# Patient Record
Sex: MALE | Race: ASIAN
[De-identification: names, ages, dates, MRNs, and addresses within clinical notes are randomized per-mention and may not be internally consistent; named-entity substitution may affect disease eponyms.]

---

## 2021-07-14 ENCOUNTER — APPOINTMENT (OUTPATIENT)
Dept: CARDIOLOGY | Facility: CLINIC | Age: 61
End: 2021-07-14
Payer: COMMERCIAL

## 2021-07-14 ENCOUNTER — NON-APPOINTMENT (OUTPATIENT)
Age: 61
End: 2021-07-14

## 2021-07-14 VITALS
WEIGHT: 206 LBS | DIASTOLIC BLOOD PRESSURE: 106 MMHG | HEIGHT: 67 IN | BODY MASS INDEX: 32.33 KG/M2 | HEART RATE: 102 BPM | OXYGEN SATURATION: 96 % | TEMPERATURE: 98.2 F | SYSTOLIC BLOOD PRESSURE: 163 MMHG | RESPIRATION RATE: 17 BRPM

## 2021-07-14 DIAGNOSIS — I10 ESSENTIAL (PRIMARY) HYPERTENSION: ICD-10-CM

## 2021-07-14 DIAGNOSIS — R06.02 SHORTNESS OF BREATH: ICD-10-CM

## 2021-07-14 PROCEDURE — 93306 TTE W/DOPPLER COMPLETE: CPT

## 2021-07-14 PROCEDURE — 99214 OFFICE O/P EST MOD 30 MIN: CPT | Mod: 25

## 2021-07-14 PROCEDURE — 93000 ELECTROCARDIOGRAM COMPLETE: CPT | Mod: 59

## 2021-07-14 PROCEDURE — 99072 ADDL SUPL MATRL&STAF TM PHE: CPT

## 2021-07-14 PROCEDURE — 93015 CV STRESS TEST SUPVJ I&R: CPT

## 2021-07-14 RX ORDER — ATENOLOL 25 MG/1
25 TABLET ORAL DAILY
Qty: 30 | Refills: 5 | Status: ACTIVE | COMMUNITY
Start: 2021-07-14 | End: 1900-01-01

## 2021-07-14 NOTE — PHYSICAL EXAM
[General Appearance - Well Developed] : well developed [Normal Appearance] : normal appearance [Well Groomed] : well groomed [General Appearance - Well Nourished] : well nourished [No Deformities] : no deformities [General Appearance - In No Acute Distress] : no acute distress [Normal Conjunctiva] : the conjunctiva exhibited no abnormalities [Eyelids - No Xanthelasma] : the eyelids demonstrated no xanthelasmas [Normal Oral Mucosa] : normal oral mucosa [No Oral Pallor] : no oral pallor [No Oral Cyanosis] : no oral cyanosis [Normal Jugular Venous A Waves Present] : normal jugular venous A waves present [Normal Jugular Venous V Waves Present] : normal jugular venous V waves present [No Jugular Venous Cruz A Waves] : no jugular venous cruz A waves [Heart Rate And Rhythm] : heart rate and rhythm were normal [Heart Sounds] : normal S1 and S2 [Murmurs] : no murmurs present [Arterial Pulses Normal] : the arterial pulses were normal [Edema] : no peripheral edema present [Respiration, Rhythm And Depth] : normal respiratory rhythm and effort [Exaggerated Use Of Accessory Muscles For Inspiration] : no accessory muscle use [Auscultation Breath Sounds / Voice Sounds] : lungs were clear to auscultation bilaterally [Abdomen Soft] : soft [Abdomen Tenderness] : non-tender [Abdomen Mass (___ Cm)] : no abdominal mass palpated [Abnormal Walk] : normal gait [Gait - Sufficient For Exercise Testing] : the gait was sufficient for exercise testing [Nail Clubbing] : no clubbing of the fingernails [Cyanosis, Localized] : no localized cyanosis [Petechial Hemorrhages (___cm)] : no petechial hemorrhages [] : no ischemic changes [Oriented To Time, Place, And Person] : oriented to person, place, and time [Affect] : the affect was normal [Mood] : the mood was normal [No Anxiety] : not feeling anxious

## 2021-07-15 PROBLEM — R06.02 SHORTNESS OF BREATH: Status: ACTIVE | Noted: 2021-07-14

## 2021-12-01 NOTE — REASON FOR VISIT
[Symptom and Test Evaluation] : symptom and test evaluation [FreeTextEntry1] : 61-year-old male with prostate CA s/p surgery in 2016  presents for followup.  \par \par Patient was last seen on 11/13/13 for evaluation of abnormal ECG.  Patient underwent an echocardiogram and it showed normal LV function, mild LVH, mild aortic root dilatation, without evidence of inferior infarct.\par \par \par

## 2021-12-01 NOTE — DISCUSSION/SUMMARY
[FreeTextEntry1] : 61-year-old male with prostate CA s/p surgery in 2016  presents for followup.  \par \par Patient was last seen on 11/13/13 for evaluation of abnormal ECG.  Patient underwent an echocardiogram and it showed normal LV function, mild LVH, mild aortic root dilatation, without evidence of inferior infarct.\par \par 7/14/21 - Patient reports that while he was at the PCP's today he felt short of breath while lying down to get ECG. Patient denies CP, SOB, palpitations, or lightheadedness normally. Patient never went on BP medication since last visit in 2013. He reports that his BP was never elevated until today and did not have SOB when lying down at home.  Patient was diagnosed with prostate cancer in 2016 with PSA over 20.  Patient has trace edema and HR is elevated at 102.  I advised patient to undergo an echocardiogram and a treadmill stress test.  I advised patient to start  Atenolol 25 mg QD. \par \par \par (1) Shortness of breath - Patient underwent an echocardiogram and it showed normal LV function without significant valvular pathology.  Patient underwent a treadmill stress test and completed 4 minutes of Harsh protocol.  There was no ECG evidence of ischemia.  Following treadmill stress, there was no echocardiographic evidence of ischemia.  Patient had hypertensive response.  I advised patient to start Atenolol 25 mg.  \par \par (2) Aortic aneurysm - Patient was noted to have mild aortic root dilatation (4.3 cm) on echo.  I advised patient to keep BP under good control.  Patient should have a followup echo in 1 year. \par \par (3) Followup - 1 month.

## 2021-12-01 NOTE — HISTORY OF PRESENT ILLNESS
[FreeTextEntry1] : 7/14/21 - Patient reports that while he was at the PCP's today he felt short of breath while lying down to get ECG. Patient denies CP, SOB, palpitations, or lightheadedness normally. Patient never went on BP medication since last visit in 2013. He reports that his BP was never elevated until today and did not have SOB when lying down at home.  Patient was diagnosed with prostate cancer in 2016 with PSA over 20.  Patient has trace edema and HR is elevated at 102.  I advised patient to undergo an echocardiogram and a treadmill stress test.  I advised patient to start  Atenolol 25 mg QD. \par \par 11/13/13 - Patient has no cardiac history.  Patient denies any CP or SOB with exertion.  Patient denies any palpitations or lightheadedness.  Patient reports no history of syncope.  Patient was found to have an abnormal ECG on routine physical with Dr. Rosas, showing q waves in the inferior leads and poor R-wave progression.